# Patient Record
Sex: FEMALE | Race: WHITE | NOT HISPANIC OR LATINO | Employment: FULL TIME | URBAN - NONMETROPOLITAN AREA
[De-identification: names, ages, dates, MRNs, and addresses within clinical notes are randomized per-mention and may not be internally consistent; named-entity substitution may affect disease eponyms.]

---

## 2018-02-22 ENCOUNTER — PROCEDURE VISIT (OUTPATIENT)
Dept: OBSTETRICS AND GYNECOLOGY | Facility: CLINIC | Age: 33
End: 2018-02-22

## 2018-02-22 VITALS
SYSTOLIC BLOOD PRESSURE: 120 MMHG | BODY MASS INDEX: 38.09 KG/M2 | DIASTOLIC BLOOD PRESSURE: 72 MMHG | HEIGHT: 62 IN | WEIGHT: 207 LBS

## 2018-02-22 DIAGNOSIS — Z12.4 SCREENING FOR MALIGNANT NEOPLASM OF CERVIX: ICD-10-CM

## 2018-02-22 DIAGNOSIS — Z01.419 ENCOUNTER FOR GYNECOLOGICAL EXAMINATION WITHOUT ABNORMAL FINDING: Primary | ICD-10-CM

## 2018-02-22 PROCEDURE — 99385 PREV VISIT NEW AGE 18-39: CPT | Performed by: PHYSICIAN ASSISTANT

## 2018-02-22 RX ORDER — LISINOPRIL 40 MG/1
40 TABLET ORAL DAILY
COMMUNITY

## 2018-02-22 NOTE — PROGRESS NOTES
"Subjective   Chief Complaint   Patient presents with   • Gynecologic Exam     No complaints       Janice Chau is a 32 y.o. year old new patient  presenting to be seen for her annual gynecological exam.   She has no complaints or concerns today  Cycles regular with Patient's last menstrual period was 2018.    - has never used contraception and does not dersire contraception  Last pap     Past Medical History:   Diagnosis Date   • Hypertension         Current Outpatient Prescriptions:   •  lisinopril (PRINIVIL,ZESTRIL) 40 MG tablet, Take 40 mg by mouth Daily., Disp: , Rfl:   •  metoprolol tartrate (LOPRESSOR) 25 MG tablet, , Disp: , Rfl: 2   No Known Allergies   Past Surgical History:   Procedure Laterality Date   • ANKLE ARTHROSCOPY WITH FUSION Right    •  SECTION        Social History     Social History   • Marital status:      Spouse name: N/A   • Number of children: N/A   • Years of education: N/A     Occupational History   • Not on file.     Social History Main Topics   • Smoking status: Never Smoker   • Smokeless tobacco: Never Used   • Alcohol use No   • Drug use: No   • Sexual activity: Yes     Partners: Male     Birth control/ protection: None     Other Topics Concern   • Not on file     Social History Narrative   • No narrative on file      Family History   Problem Relation Age of Onset   • Hypertension Father    • Hypertension Mother    • No Known Problems Son        Review of Systems   Constitutional:        Weight gain   Gastrointestinal: Negative.    Genitourinary: Negative.            Objective   /72  Ht 157.5 cm (62\")  Wt 93.9 kg (207 lb)  LMP 2018  Breastfeeding? No  BMI 37.86 kg/m2    Physical Exam   Constitutional: She appears well-developed and well-nourished. She is cooperative.   Pulmonary/Chest: Effort normal and breath sounds normal.   Abdominal: Soft. Normal appearance. There is no tenderness.   Genitourinary: Vagina normal and uterus " normal. There is no tenderness or lesion on the right labia. There is no tenderness or lesion on the left labia. Cervix exhibits no motion tenderness and no discharge. Right adnexum displays no mass and no tenderness. Left adnexum displays no mass and no tenderness.   Neurological: She is alert.   Skin: Skin is warm and dry.   Psychiatric: She has a normal mood and affect. Her behavior is normal.            Assessment and Plan  Janice was seen today for gynecologic exam.    Diagnoses and all orders for this visit:    Encounter for gynecological examination without abnormal finding    Screening for malignant neoplasm of cervix  -     Liquid-based Pap Smear, Screening; Future      Patient Instructions   Self breast exam monthly  Regular excercise             This note was electronically signed.    Leia Young PA-C   February 22, 2018

## 2018-03-02 DIAGNOSIS — Z12.4 SCREENING FOR MALIGNANT NEOPLASM OF CERVIX: ICD-10-CM

## 2019-05-16 ENCOUNTER — OFFICE VISIT (OUTPATIENT)
Dept: OBSTETRICS AND GYNECOLOGY | Facility: CLINIC | Age: 34
End: 2019-05-16

## 2019-05-16 VITALS
DIASTOLIC BLOOD PRESSURE: 78 MMHG | BODY MASS INDEX: 41.41 KG/M2 | WEIGHT: 225 LBS | HEIGHT: 62 IN | SYSTOLIC BLOOD PRESSURE: 118 MMHG

## 2019-05-16 DIAGNOSIS — Z12.4 SCREENING FOR MALIGNANT NEOPLASM OF CERVIX: ICD-10-CM

## 2019-05-16 DIAGNOSIS — R11.0 NAUSEA: ICD-10-CM

## 2019-05-16 DIAGNOSIS — N92.6 ABNORMAL MENSTRUAL PERIODS: ICD-10-CM

## 2019-05-16 DIAGNOSIS — Z01.419 ENCOUNTER FOR GYNECOLOGICAL EXAMINATION WITHOUT ABNORMAL FINDING: Primary | ICD-10-CM

## 2019-05-16 LAB — HCG INTACT+B SERPL-ACNC: <2.39 MIU/ML

## 2019-05-16 PROCEDURE — 99395 PREV VISIT EST AGE 18-39: CPT | Performed by: PHYSICIAN ASSISTANT

## 2019-05-16 RX ORDER — MINOCYCLINE HYDROCHLORIDE 100 MG/1
CAPSULE ORAL
Refills: 1 | COMMUNITY
Start: 2019-04-18 | End: 2020-06-11

## 2019-05-16 NOTE — PROGRESS NOTES
Subjective   Chief Complaint   Patient presents with   • Gynecologic Exam     Last pap done 18-WNL   • Menstrual Problem     Patient states that she had an extra last month        Janice Chau is a 33 y.o. year old  presenting to be seen for her annual gynecological exam.   She reports starting her period this morning but still wants to do pap smear.  Has been having regular periods q 28 days until April she had period  then again .   She is . Using withdrawal for birth control. Reports has been feeling nauseous for 3-4 weeks. Eating seems to improve nausea but nausea lasts all day.  Has had no pelvic pain.  Has had breast tenderness for 2-3 weeks also.      Past Medical History:   Diagnosis Date   • Hypertension         Current Outpatient Medications:   •  lisinopril (PRINIVIL,ZESTRIL) 40 MG tablet, Take 40 mg by mouth Daily., Disp: , Rfl:   •  metoprolol tartrate (LOPRESSOR) 25 MG tablet, , Disp: , Rfl: 2  •  minocycline (MINOCIN,DYNACIN) 100 MG capsule, TAKE ONE CAPSULE BY MOUTH TWO TIMES A DAY WITH FOOD AND A FULL GLASS OF WATER  AVOID TAKING WITH DAIRY PRODUCTS, Disp: , Rfl: 1   No Known Allergies   Past Surgical History:   Procedure Laterality Date   • ANKLE ARTHROSCOPY WITH FUSION Right    •  SECTION        Social History     Socioeconomic History   • Marital status:      Spouse name: Not on file   • Number of children: Not on file   • Years of education: Not on file   • Highest education level: Not on file   Tobacco Use   • Smoking status: Never Smoker   • Smokeless tobacco: Never Used   Substance and Sexual Activity   • Alcohol use: No   • Drug use: No   • Sexual activity: Yes     Partners: Male     Birth control/protection: None      Family History   Problem Relation Age of Onset   • Hypertension Father    • Hypertension Mother    • No Known Problems Son        Review of Systems   Constitutional: Negative.    Gastrointestinal: Positive for nausea. Negative  "for vomiting.   Genitourinary: Positive for menstrual problem. Negative for difficulty urinating, dysuria, pelvic pain and vaginal discharge.           Objective   /78   Ht 157.5 cm (62\")   Wt 102 kg (225 lb)   LMP 05/16/2019 (Exact Date)   Breastfeeding? No   BMI 41.15 kg/m²     Physical Exam   Constitutional: She appears well-developed and well-nourished. She is cooperative. No distress.   Pulmonary/Chest: Right breast exhibits no inverted nipple, no mass, no nipple discharge, no skin change and no tenderness. Left breast exhibits no inverted nipple, no mass, no nipple discharge, no skin change and no tenderness.   Abdominal: Soft. Normal appearance. There is no tenderness. There is no rigidity and no guarding.   Genitourinary: Uterus normal. There is no tenderness or lesion on the right labia. There is no tenderness or lesion on the left labia. Cervix exhibits no motion tenderness. Right adnexum displays no mass and no tenderness. Left adnexum displays no mass and no tenderness. There is bleeding in the vagina.   Genitourinary Comments: Uterus retroverted  Pap done   Neurological: She is alert.   Skin: Skin is warm and dry.   Psychiatric: She has a normal mood and affect. Her behavior is normal. Thought content normal.            Assessment and Plan  Janice was seen today for gynecologic exam and menstrual problem.    Diagnoses and all orders for this visit:    Encounter for gynecological examination without abnormal finding    Screening for malignant neoplasm of cervix  -     Liquid-based Pap Smear, Screening; Future    Abnormal menstrual periods  -     HCG, B-subunit, Quantitative ([facility] Only)    Nausea  -     HCG, B-subunit, Quantitative ([facility] Only)      Patient Instructions   will check serum HCG today. If negative will see how current period turns out and if normal will observe cycles. If continued abnormal periods then RTO for pelvic ultrasound                    This note was electronically " signed.    Leia Young PA-C   May 16, 2019

## 2019-05-16 NOTE — PATIENT INSTRUCTIONS
will check serum HCG today. If negative will see how current period turns out and if normal will observe cycles. If continued abnormal periods then RTO for pelvic ultrasound

## 2019-05-29 DIAGNOSIS — Z12.4 SCREENING FOR MALIGNANT NEOPLASM OF CERVIX: ICD-10-CM

## 2020-06-11 ENCOUNTER — OFFICE VISIT (OUTPATIENT)
Dept: OBSTETRICS AND GYNECOLOGY | Facility: CLINIC | Age: 35
End: 2020-06-11

## 2020-06-11 VITALS
DIASTOLIC BLOOD PRESSURE: 76 MMHG | BODY MASS INDEX: 34.71 KG/M2 | HEIGHT: 62 IN | SYSTOLIC BLOOD PRESSURE: 120 MMHG | WEIGHT: 188.6 LBS

## 2020-06-11 DIAGNOSIS — Z01.419 ENCOUNTER FOR GYNECOLOGICAL EXAMINATION WITHOUT ABNORMAL FINDING: Primary | ICD-10-CM

## 2020-06-11 DIAGNOSIS — Z12.4 SCREENING FOR CERVICAL CANCER: ICD-10-CM

## 2020-06-11 DIAGNOSIS — Z11.3 SCREENING FOR STD (SEXUALLY TRANSMITTED DISEASE): ICD-10-CM

## 2020-06-11 DIAGNOSIS — Z30.014 ENCOUNTER FOR INITIAL PRESCRIPTION OF INTRAUTERINE CONTRACEPTIVE DEVICE (IUD): ICD-10-CM

## 2020-06-11 PROCEDURE — 99395 PREV VISIT EST AGE 18-39: CPT | Performed by: PHYSICIAN ASSISTANT

## 2020-06-11 NOTE — PROGRESS NOTES
Subjective   Chief Complaint   Patient presents with   • Gynecologic Exam     Last pap done 19-WNL, No complaints   • Contraception     would like to discuss birth control options       Janice Chau is a 34 y.o. year old  presenting to be seen for her annual gynecological exam.   She has no complaints.  She is  and has been using withdrawal for contraception but is now wanting to use different birth control and is interested in ParaGard IUD.  She is wanting a nonhormonal birth control due to her hypertension.  Patient reports she has a regular monthly cycle and her last menstrual period was 2020.  She would like STI screening done with her Pap smear.  She is having no symptoms of an STI or vaginal discharge however her  was unfaithful 2019 and patient desires screening.    Past Medical History:   Diagnosis Date   • Hypertension         Current Outpatient Medications:   •  lisinopril (PRINIVIL,ZESTRIL) 40 MG tablet, Take 40 mg by mouth Daily., Disp: , Rfl:   •  metoprolol tartrate (LOPRESSOR) 25 MG tablet, , Disp: , Rfl: 2   No Known Allergies   Past Surgical History:   Procedure Laterality Date   • ANKLE ARTHROSCOPY WITH FUSION Right    •  SECTION        Social History     Socioeconomic History   • Marital status:      Spouse name: Not on file   • Number of children: Not on file   • Years of education: Not on file   • Highest education level: Not on file   Tobacco Use   • Smoking status: Never Smoker   • Smokeless tobacco: Never Used   Substance and Sexual Activity   • Alcohol use: No   • Drug use: No   • Sexual activity: Yes     Partners: Male     Birth control/protection: None      Family History   Problem Relation Age of Onset   • Hypertension Father    • Hypertension Mother    • No Known Problems Son        Review of Systems   Constitutional: Negative.    Gastrointestinal: Negative.    Genitourinary: Negative for difficulty urinating, dysuria, menstrual  "problem, pelvic pain, vaginal bleeding and vaginal discharge.   Musculoskeletal: Negative.            Objective   /76   Ht 157.5 cm (62\")   Wt 85.5 kg (188 lb 9.6 oz)   LMP 05/22/2020 (Exact Date)   Breastfeeding No   BMI 34.50 kg/m²     Physical Exam   Constitutional: She appears well-developed and well-nourished. She is cooperative. No distress.   Eyes: Conjunctivae, EOM and lids are normal.   Pulmonary/Chest: Right breast exhibits no inverted nipple, no mass, no nipple discharge, no skin change and no tenderness. Left breast exhibits no inverted nipple, no mass, no nipple discharge, no skin change and no tenderness.   Abdominal: Soft. Normal appearance. There is no tenderness.   Genitourinary: Uterus normal. There is no tenderness or lesion on the right labia. There is no tenderness or lesion on the left labia. Cervix exhibits no motion tenderness, no discharge and no friability. Right adnexum displays no mass and no tenderness. Left adnexum displays no mass and no tenderness. No erythema or tenderness in the vagina. No vaginal discharge found.   Genitourinary Comments: Pap done   Musculoskeletal: Normal range of motion.   Neurological: She is alert.   Skin: Skin is warm and dry. No lesion and no rash noted.   Psychiatric: She has a normal mood and affect. Her behavior is normal. Thought content normal.            Assessment and Plan  Janice was seen today for gynecologic exam and contraception.    Diagnoses and all orders for this visit:    Encounter for gynecological examination without abnormal finding    Screening for cervical cancer  -     Liquid-based Pap Smear, Screening; Future    Encounter for initial prescription of intrauterine contraceptive device (IUD)    Screening for STD (sexually transmitted disease)      Patient Instructions   Encourage self breast exam monthly  Regular exercise  GC CY screening done with pap  Call office the first day of menses to schedule Paragard insertion         "     This note was electronically signed.    Leia Young PA-C   June 11, 2020

## 2020-06-11 NOTE — PATIENT INSTRUCTIONS
Encourage self breast exam monthly  Regular exercise  GC CY screening done with pap  Call office the first day of menses to schedule Paragard insertion

## 2020-06-22 DIAGNOSIS — Z12.4 SCREENING FOR CERVICAL CANCER: ICD-10-CM

## 2023-10-13 ENCOUNTER — HOSPITAL ENCOUNTER (OUTPATIENT)
Dept: CT IMAGING | Facility: HOSPITAL | Age: 38
Discharge: HOME | End: 2023-10-13
Payer: COMMERCIAL

## 2023-10-13 DIAGNOSIS — R41.3 MEMORY LOSS: ICD-10-CM

## 2023-10-13 PROCEDURE — 70450 CT HEAD/BRAIN W/O DYE: CPT
